# Patient Record
Sex: MALE | Race: WHITE
[De-identification: names, ages, dates, MRNs, and addresses within clinical notes are randomized per-mention and may not be internally consistent; named-entity substitution may affect disease eponyms.]

---

## 2018-08-09 ENCOUNTER — HOSPITAL ENCOUNTER (OUTPATIENT)
Dept: HOSPITAL 95 - LAB SHORT | Age: 78
Discharge: HOME | End: 2018-08-09
Attending: FAMILY MEDICINE
Payer: MEDICARE

## 2018-08-09 DIAGNOSIS — N18.9: ICD-10-CM

## 2018-08-09 DIAGNOSIS — I42.0: ICD-10-CM

## 2018-08-09 DIAGNOSIS — E11.22: Primary | ICD-10-CM

## 2018-08-09 LAB
ANION GAP SERPL CALCULATED.4IONS-SCNC: 8 MMOL/L (ref 6–16)
BUN SERPL-MCNC: 22 MG/DL (ref 8–24)
CALCIUM SERPL-MCNC: 9.4 MG/DL (ref 8.5–10.1)
CHLORIDE SERPL-SCNC: 104 MMOL/L (ref 98–108)
CO2 SERPL-SCNC: 28 MMOL/L (ref 21–32)
CREAT SERPL-MCNC: 1.44 MG/DL (ref 0.6–1.2)
GLUCOSE SERPL-MCNC: 105 MG/DL (ref 70–99)
POTASSIUM SERPL-SCNC: 4.5 MMOL/L (ref 3.5–5.5)
SODIUM SERPL-SCNC: 140 MMOL/L (ref 136–145)

## 2020-01-09 ENCOUNTER — OFFICE VISIT (OUTPATIENT)
Dept: URGENT CARE | Facility: CLINIC | Age: 80
End: 2020-01-09
Payer: MEDICARE

## 2020-01-09 VITALS
WEIGHT: 250 LBS | HEIGHT: 71 IN | OXYGEN SATURATION: 95 % | RESPIRATION RATE: 16 BRPM | BODY MASS INDEX: 35 KG/M2 | SYSTOLIC BLOOD PRESSURE: 122 MMHG | HEART RATE: 60 BPM | TEMPERATURE: 98.4 F | DIASTOLIC BLOOD PRESSURE: 74 MMHG

## 2020-01-09 DIAGNOSIS — J22 ACUTE RESPIRATORY INFECTION: ICD-10-CM

## 2020-01-09 DIAGNOSIS — H10.33 ACUTE BACTERIAL CONJUNCTIVITIS OF BOTH EYES: ICD-10-CM

## 2020-01-09 PROCEDURE — 99204 OFFICE O/P NEW MOD 45 MIN: CPT | Performed by: FAMILY MEDICINE

## 2020-01-09 RX ORDER — CARVEDILOL 25 MG/1
25 TABLET ORAL 2 TIMES DAILY WITH MEALS
COMMUNITY

## 2020-01-09 RX ORDER — ASPIRIN 81 MG/1
81 TABLET, CHEWABLE ORAL DAILY
COMMUNITY

## 2020-01-09 RX ORDER — ALLOPURINOL 300 MG/1
300 TABLET ORAL DAILY
COMMUNITY

## 2020-01-09 RX ORDER — FAMOTIDINE 20 MG/1
20 TABLET, FILM COATED ORAL 2 TIMES DAILY
COMMUNITY

## 2020-01-09 RX ORDER — OFLOXACIN 3 MG/ML
SOLUTION/ DROPS OPHTHALMIC
Qty: 5 ML | Refills: 1 | Status: SHIPPED | OUTPATIENT
Start: 2020-01-09

## 2020-01-09 RX ORDER — FUROSEMIDE 20 MG/1
20 TABLET ORAL 2 TIMES DAILY
COMMUNITY

## 2020-01-09 SDOH — HEALTH STABILITY: MENTAL HEALTH: HOW OFTEN DO YOU HAVE A DRINK CONTAINING ALCOHOL?: 2-3 TIMES A WEEK

## 2020-01-09 SDOH — HEALTH STABILITY: MENTAL HEALTH: HOW MANY STANDARD DRINKS CONTAINING ALCOHOL DO YOU HAVE ON A TYPICAL DAY?: NOT ASKED

## 2020-01-09 NOTE — PROGRESS NOTES
Chief Complaint:    Chief Complaint   Patient presents with   • Eye Drainage     congestion, loss of voice, productive cough x2 day       History of Present Illness:    This is a new problem. For 2 days, he has red eyes with purulent drainage from eyes, L>R. Now at least moderate severity and not getting better. No contact lens use, foreign body to eye, or change in vision. He has had pink eye in the past, last time was about 5 years ago, treated with unknown antibiotic eye drops. For 3 days, he has some voice hoarseness and coughed up 2 globs of purulent mucus this AM, but otherwise has not really been coughing. No fever or purulent mucus from nose. Used Sudafed for symptoms.      Review of Systems:    Constitutional: Negative for fever, chills, and diaphoresis.   Eyes: See HPI.   ENT: Negative for ear pain, ear discharge, tinnitus, nasal congestion, nosebleeds, and sore throat.    Respiratory: See HPI.   Cardiovascular: Negative for chest pain, palpitations, orthopnea, claudication, leg swelling, and PND.   Gastrointestinal: Negative for abdominal pain, nausea, vomiting, diarrhea, constipation, blood in stool, and melena.   Genitourinary: Negative for dysuria, urinary urgency, urinary frequency, hematuria, and flank pain.   Musculoskeletal: Negative for myalgias, joint pain, neck pain, and back pain.   Skin: Negative for rash and itching.   Neurological: Negative for dizziness, tingling, tremors, sensory change, speech change, focal weakness, seizures, loss of consciousness, and headaches.   Endo: Negative for polydipsia.   Heme: Does not bruise/bleed easily.   Psychiatric/Behavioral: Negative for depression, suicidal ideas, hallucinations, memory loss and substance abuse. The patient is not nervous/anxious and does not have insomnia.        Past Medical History:    History reviewed. No pertinent past medical history.    Past Surgical History:    History reviewed. No pertinent surgical history.    Social  History:    Social History     Socioeconomic History   • Marital status:      Spouse name: Not on file   • Number of children: Not on file   • Years of education: Not on file   • Highest education level: Not on file   Occupational History   • Not on file   Social Needs   • Financial resource strain: Not on file   • Food insecurity:     Worry: Not on file     Inability: Not on file   • Transportation needs:     Medical: Not on file     Non-medical: Not on file   Tobacco Use   • Smoking status: Never Smoker   • Smokeless tobacco: Never Used   Substance and Sexual Activity   • Alcohol use: Yes     Frequency: 2-3 times a week   • Drug use: Not on file   • Sexual activity: Not on file   Lifestyle   • Physical activity:     Days per week: Not on file     Minutes per session: Not on file   • Stress: Not on file   Relationships   • Social connections:     Talks on phone: Not on file     Gets together: Not on file     Attends [employer] service: Not on file     Active member of club or organization: Not on file     Attends meetings of clubs or organizations: Not on file     Relationship status: Not on file   • Intimate partner violence:     Fear of current or ex partner: Not on file     Emotionally abused: Not on file     Physically abused: Not on file     Forced sexual activity: Not on file   Other Topics Concern   • Not on file   Social History Narrative   • Not on file     Family History:    History reviewed. No pertinent family history.    Medications:    Current Outpatient Medications on File Prior to Visit   Medication Sig Dispense Refill   • allopurinol (ZYLOPRIM) 300 MG Tab Take 300 mg by mouth every day.     • carvedilol (COREG) 25 MG Tab Take 25 mg by mouth 2 times a day, with meals.     • furosemide (LASIX) 20 MG Tab Take 20 mg by mouth 2 times a day.     • LOVASTATIN PO Take  by mouth.     • famotidine (PEPCID) 20 MG Tab Take 20 mg by mouth 2 times a day.     • aspirin (ASA) 81 MG Chew Tab chewable tablet  "Take 81 mg by mouth every day.     • Calcium-Magnesium-Vitamin D (CALCIUM 1200+D3) 600- MG-MG-UNIT TABLET SR 24 HR Take  by mouth.       No current facility-administered medications on file prior to visit.      Allergies:    No Known Allergies      Vitals:    Vitals:    01/09/20 1232   BP: 122/74   Pulse: 60   Resp: 16   Temp: 36.9 °C (98.4 °F)   TempSrc: Temporal   SpO2: 95%   Weight: 113.4 kg (250 lb)   Height: 1.803 m (5' 11\")       Physical Exam:    Constitutional: Vital signs reviewed. Appears well-developed and well-nourished. Hoarse. No acute distress.   Eyes: Bilateral conjunctivae are injected, L (moderate) > R (mild-moderate). Mild swelling of bilateral eyelids. PERRLA.  ENT: Wears bilateral hearing aids. External ears normal. Nasal mucosa pink. Lips/teeth are normal. Oral mucosa pink and moist. Posterior pharynx: WNL.  Neck: Neck supple.   Cardiovascular: Regular rate and rhythm. No murmur.  Pulmonary/Chest: Respirations non-labored. Clear to auscultation bilaterally.  Lymph: Cervical nodes without tenderness or enlargement.  Musculoskeletal: Normal gait. Normal range of motion. No muscular atrophy or weakness.  Neurological: Alert and oriented to person, place, and time. CN 2-12 intact. Muscle tone normal. Coordination normal.   Skin: No rashes or lesions. Warm, dry, normal turgor.  Psychiatric: Normal mood and affect. Behavior is normal. Judgment and thought content normal.       Assessment / Plan:    1. Acute bacterial conjunctivitis of both eyes  - ofloxacin (OCUFLOX) 0.3 % Solution; 1-2 DROPS TO BOTH EYES EVERY 2-4 HOURS WHILE AWAKE. USE UNTIL BETTER AND FOR ONE EXTRA DAY AFTER BETTER.  Dispense: 5 mL; Refill: 1    2. Acute respiratory infection      Discussed with him DDX, management options, and risks, benefits, and alternatives to treatment plan agreed upon.    Agreeable to medication prescribed.    Otherwise recommended further observation of other symptoms and see if other symptoms will " self-resolve as likely viral etiology at this time.    May use OTC meds for symptoms prn.    Recommended good hand hygiene, wash linens and towels.    Discussed expected course of duration, time for improvement, and to seek follow-up in Emergency Room, urgent care, or with PCP if getting worse at any time or not improving within expected time frame.

## 2020-08-10 NOTE — NUR
2ML AIR REMOVED FROM RIGHT WRIST TR BAND. RADIAL PULSE PALBABLE, CAP REFILL <3
MOVES ALL FIVE FIGNERS WITHOUT DIFFICULTY.

## 2020-08-10 NOTE — NUR
ASSUMED CARE OF PATIENT AT APPROXIMATELY 1905 FROM SUZANNE GARCIA RN.  PATIENT ALERT
AND ORIENTED; HARD OF HEARING.  PATIENT S/P ANGIO TO RIGHT RADIAL; TEGADERM IN
PLACE; SMALL AMOUNT OF DRIED BLOOD NOTED; ARMBOARD IN PLACE; NEEDS REMINDED
OCCASIONALLY NOT TO USE RIGHT WRIST; ATTEMPTED ACCESS THROUGH RIGHT ELBOW WITH
MODERATE AMOUNT OF DRIED BLOOD NOTED.  WHEN ASKED IF PATIENT NEEDED ANYTHING
PATIENT REPORTS "THREE BEERS".  PATIENT DENIES PAIN, NUMBNESS, TINGLING,
DIZZINESS OR NAUSEA.  PIV S/L.  SBA OUT OF BED TO BATHROOM.  PATIENT REPORTS
HE APPROVED ME BEING HIS RN TONIGHT.
PATIENT CURRENTLY RESTING IN BED; CALL LIGHT IN REACH; BED IN LOWEST
POSISTION; WILL CONTINUE TO MONITOR AND ASSESS UNTIL END OF SHIFT.

## 2020-08-10 NOTE — NUR
SHIFT SUMMARY
PT IS ALERT AND ORIENTED X4. TR BAND WAS SLOW TO REMOVE DUE TO OOZING AT SITE,
NO HEMATOMA FORMED, PULSES INTACT. RIGHT AC SITE ALSO HAD SOME BLEEDING, BUT
NO INTERVENTION NEEDED, AREA IS SOFT. TELEMETRY SHOWS PT TO % PACED. PT
IS SBA TO RESTROOM. OTHER VITALS HAVE REMAINED STABLE. PT DENIES ANY CHEST
PAIN OR DISCOMFORT.

## 2020-08-11 NOTE — NUR
CONTRERAS SLEPT ABOUT SIX HOURS LAST NIGHT; REPORTED THAT HE TOOK A NAP ON
DAYSHIFT AND PROBABLY WONT SLEEP MUCH.  VSS.  NO ACUTE CHANGES TO REPORT.
WILL CONTINUE TO MONITOR AND ASSESS UNTIL END OF SHIFT.

## 2020-08-11 NOTE — NUR
PT DISCHARGED TO HOME TODAY PER DR DUMAS'S ORDERS. NO ACUTE
CHANGES/COMPLAINS FOR THE SHIFT, DENIES CHEST PAIN, VITALS STABLE. RIGHT
RADIAL AND BRACHIAL SITE INTACT NO REDNESS/BLEEDING AROUND THE AREA. PT TO
START ON PLAVIX 75MG QD, DISCHARGE MEDICATION AND INSTRUCTIONS DISCLOSED WITH
PT. ALL BELONGINGS SENT WITH PT, PRESCRIPTION SENT TO [facility].
ACCOMPANIED BY CNA AMBULATED FOR TRANSPORT.